# Patient Record
Sex: FEMALE | Race: OTHER | NOT HISPANIC OR LATINO | URBAN - METROPOLITAN AREA
[De-identification: names, ages, dates, MRNs, and addresses within clinical notes are randomized per-mention and may not be internally consistent; named-entity substitution may affect disease eponyms.]

---

## 2017-03-08 ENCOUNTER — OUTPATIENT (OUTPATIENT)
Dept: OUTPATIENT SERVICES | Facility: HOSPITAL | Age: 47
LOS: 1 days | End: 2017-03-08
Payer: COMMERCIAL

## 2017-03-08 VITALS
HEIGHT: 64 IN | WEIGHT: 194.01 LBS | HEART RATE: 84 BPM | TEMPERATURE: 98 F | RESPIRATION RATE: 14 BRPM | SYSTOLIC BLOOD PRESSURE: 103 MMHG | DIASTOLIC BLOOD PRESSURE: 58 MMHG

## 2017-03-08 DIAGNOSIS — Z41.9 ENCOUNTER FOR PROCEDURE FOR PURPOSES OTHER THAN REMEDYING HEALTH STATE, UNSPECIFIED: Chronic | ICD-10-CM

## 2017-03-08 DIAGNOSIS — Z01.812 ENCOUNTER FOR PREPROCEDURAL LABORATORY EXAMINATION: ICD-10-CM

## 2017-03-08 DIAGNOSIS — N84.1 POLYP OF CERVIX UTERI: ICD-10-CM

## 2017-03-08 LAB
HCG UR QL: NEGATIVE — SIGNIFICANT CHANGE UP
HCT VFR BLD CALC: 39 % — SIGNIFICANT CHANGE UP (ref 34.5–45)
HGB BLD-MCNC: 13 G/DL — SIGNIFICANT CHANGE UP (ref 11.5–15.5)
MCHC RBC-ENTMCNC: 32.1 PG — SIGNIFICANT CHANGE UP (ref 27–34)
MCHC RBC-ENTMCNC: 33.3 GM/DL — SIGNIFICANT CHANGE UP (ref 32–36)
MCV RBC AUTO: 96.2 FL — SIGNIFICANT CHANGE UP (ref 80–100)
PLATELET # BLD AUTO: 191 K/UL — SIGNIFICANT CHANGE UP (ref 150–400)
RBC # BLD: 4.05 M/UL — SIGNIFICANT CHANGE UP (ref 3.8–5.2)
RBC # FLD: 12.2 % — SIGNIFICANT CHANGE UP (ref 10.3–14.5)
WBC # BLD: 8.8 K/UL — SIGNIFICANT CHANGE UP (ref 3.8–10.5)
WBC # FLD AUTO: 8.8 K/UL — SIGNIFICANT CHANGE UP (ref 3.8–10.5)

## 2017-03-08 PROCEDURE — 86850 RBC ANTIBODY SCREEN: CPT

## 2017-03-08 PROCEDURE — 85027 COMPLETE CBC AUTOMATED: CPT

## 2017-03-08 PROCEDURE — 86900 BLOOD TYPING SEROLOGIC ABO: CPT

## 2017-03-08 PROCEDURE — 86901 BLOOD TYPING SEROLOGIC RH(D): CPT

## 2017-03-08 PROCEDURE — G0463: CPT

## 2017-03-08 PROCEDURE — 81025 URINE PREGNANCY TEST: CPT

## 2017-03-08 NOTE — H&P PST ADULT - NSANTHOSAYNRD_GEN_A_CORE
No. JENNIFER screening performed.  STOP BANG Legend: 0-2 = LOW Risk; 3-4 = INTERMEDIATE Risk; 5-8 = HIGH Risk

## 2017-03-08 NOTE — H&P PST ADULT - HISTORY OF PRESENT ILLNESS
46 yo F for dilation and curettage  hysteroscopy  w Myosure and U/S  LMP 17     Pt reports menses is normal

## 2017-03-10 RX ORDER — ACETAMINOPHEN 500 MG
650 TABLET ORAL ONCE
Qty: 0 | Refills: 0 | Status: COMPLETED | OUTPATIENT
Start: 2017-03-21 | End: 2017-03-21

## 2017-03-10 RX ORDER — SODIUM CHLORIDE 9 MG/ML
1000 INJECTION, SOLUTION INTRAVENOUS
Qty: 0 | Refills: 0 | Status: DISCONTINUED | OUTPATIENT
Start: 2017-03-21 | End: 2017-03-21

## 2017-03-20 ENCOUNTER — RESULT REVIEW (OUTPATIENT)
Age: 47
End: 2017-03-20

## 2017-03-21 ENCOUNTER — TRANSCRIPTION ENCOUNTER (OUTPATIENT)
Age: 47
End: 2017-03-21

## 2017-03-21 ENCOUNTER — OUTPATIENT (OUTPATIENT)
Dept: OUTPATIENT SERVICES | Facility: HOSPITAL | Age: 47
LOS: 1 days | Discharge: HOME | End: 2017-03-21
Payer: COMMERCIAL

## 2017-03-21 VITALS
OXYGEN SATURATION: 98 % | RESPIRATION RATE: 14 BRPM | SYSTOLIC BLOOD PRESSURE: 107 MMHG | HEIGHT: 64 IN | DIASTOLIC BLOOD PRESSURE: 70 MMHG | TEMPERATURE: 99 F | WEIGHT: 194.01 LBS | HEART RATE: 73 BPM

## 2017-03-21 VITALS
SYSTOLIC BLOOD PRESSURE: 102 MMHG | OXYGEN SATURATION: 100 % | RESPIRATION RATE: 17 BRPM | DIASTOLIC BLOOD PRESSURE: 64 MMHG | HEART RATE: 56 BPM

## 2017-03-21 DIAGNOSIS — N84.1 POLYP OF CERVIX UTERI: ICD-10-CM

## 2017-03-21 DIAGNOSIS — Z41.9 ENCOUNTER FOR PROCEDURE FOR PURPOSES OTHER THAN REMEDYING HEALTH STATE, UNSPECIFIED: Chronic | ICD-10-CM

## 2017-03-21 PROCEDURE — 81025 URINE PREGNANCY TEST: CPT

## 2017-03-21 PROCEDURE — 88305 TISSUE EXAM BY PATHOLOGIST: CPT

## 2017-03-21 PROCEDURE — 58558 HYSTEROSCOPY BIOPSY: CPT

## 2017-03-21 PROCEDURE — 76998 US GUIDE INTRAOP: CPT

## 2017-03-21 PROCEDURE — 88305 TISSUE EXAM BY PATHOLOGIST: CPT | Mod: 26

## 2017-03-21 PROCEDURE — 76998 US GUIDE INTRAOP: CPT | Mod: 26

## 2017-03-21 RX ORDER — SODIUM CHLORIDE 9 MG/ML
1000 INJECTION, SOLUTION INTRAVENOUS
Qty: 0 | Refills: 0 | Status: DISCONTINUED | OUTPATIENT
Start: 2017-03-21 | End: 2017-03-21

## 2017-03-21 RX ORDER — HYDROMORPHONE HYDROCHLORIDE 2 MG/ML
0.5 INJECTION INTRAMUSCULAR; INTRAVENOUS; SUBCUTANEOUS
Qty: 0 | Refills: 0 | Status: DISCONTINUED | OUTPATIENT
Start: 2017-03-21 | End: 2017-03-21

## 2017-03-21 RX ORDER — MEPERIDINE HYDROCHLORIDE 50 MG/ML
12.5 INJECTION INTRAMUSCULAR; INTRAVENOUS; SUBCUTANEOUS
Qty: 0 | Refills: 0 | Status: DISCONTINUED | OUTPATIENT
Start: 2017-03-21 | End: 2017-03-21

## 2017-03-21 RX ADMIN — SODIUM CHLORIDE 100 MILLILITER(S): 9 INJECTION, SOLUTION INTRAVENOUS at 14:18

## 2017-03-21 RX ADMIN — Medication 650 MILLIGRAM(S): at 12:43

## 2017-03-21 RX ADMIN — SODIUM CHLORIDE 75 MILLILITER(S): 9 INJECTION, SOLUTION INTRAVENOUS at 12:43

## 2017-03-21 NOTE — ASU DISCHARGE PLAN (ADULT/PEDIATRIC). - ACTIVITY LEVEL
no sports/gym/no tampons/no heavy lifting/no exercise/no tub baths/no intercourse/no douching/nothing per vagina

## 2017-03-21 NOTE — ASU DISCHARGE PLAN (ADULT/PEDIATRIC). - NOTIFY
GYN Fever>100.4/Excessive Diarrhea/Inability to Tolerate Liquids or Foods/Swelling that continues/Bleeding that does not stop/Numbness, tingling/Unable to Urinate/Persistent Nausea and Vomiting/Numbness, color, or temperature change to extremity/Pain not relieved by Medications

## 2017-03-23 DIAGNOSIS — N93.8 OTHER SPECIFIED ABNORMAL UTERINE AND VAGINAL BLEEDING: ICD-10-CM

## 2017-03-23 DIAGNOSIS — Z88.0 ALLERGY STATUS TO PENICILLIN: ICD-10-CM

## 2017-03-23 DIAGNOSIS — E66.9 OBESITY, UNSPECIFIED: ICD-10-CM

## 2017-03-23 DIAGNOSIS — D25.0 SUBMUCOUS LEIOMYOMA OF UTERUS: ICD-10-CM

## 2017-03-24 LAB — SURGICAL PATHOLOGY FINAL REPORT - CH: SIGNIFICANT CHANGE UP

## 2017-04-03 DIAGNOSIS — N84.0 POLYP OF CORPUS UTERI: ICD-10-CM

## 2017-08-22 ENCOUNTER — OUTPATIENT (OUTPATIENT)
Dept: OUTPATIENT SERVICES | Facility: HOSPITAL | Age: 47
LOS: 1 days | End: 2017-08-22
Payer: COMMERCIAL

## 2017-08-22 VITALS
RESPIRATION RATE: 16 BRPM | WEIGHT: 195.99 LBS | TEMPERATURE: 98 F | DIASTOLIC BLOOD PRESSURE: 73 MMHG | HEART RATE: 61 BPM | SYSTOLIC BLOOD PRESSURE: 109 MMHG

## 2017-08-22 DIAGNOSIS — Z98.890 OTHER SPECIFIED POSTPROCEDURAL STATES: Chronic | ICD-10-CM

## 2017-08-22 DIAGNOSIS — Z01.818 ENCOUNTER FOR OTHER PREPROCEDURAL EXAMINATION: ICD-10-CM

## 2017-08-22 DIAGNOSIS — N84.0 POLYP OF CORPUS UTERI: ICD-10-CM

## 2017-08-22 DIAGNOSIS — Z41.9 ENCOUNTER FOR PROCEDURE FOR PURPOSES OTHER THAN REMEDYING HEALTH STATE, UNSPECIFIED: Chronic | ICD-10-CM

## 2017-08-22 LAB
HCG UR QL: NEGATIVE — SIGNIFICANT CHANGE UP
HCT VFR BLD CALC: 38.2 % — SIGNIFICANT CHANGE UP (ref 34.5–45)
HGB BLD-MCNC: 13.3 G/DL — SIGNIFICANT CHANGE UP (ref 11.5–15.5)
MCHC RBC-ENTMCNC: 32.9 PG — SIGNIFICANT CHANGE UP (ref 27–34)
MCHC RBC-ENTMCNC: 34.7 GM/DL — SIGNIFICANT CHANGE UP (ref 32–36)
MCV RBC AUTO: 94.8 FL — SIGNIFICANT CHANGE UP (ref 80–100)
PLATELET # BLD AUTO: 185 K/UL — SIGNIFICANT CHANGE UP (ref 150–400)
RBC # BLD: 4.03 M/UL — SIGNIFICANT CHANGE UP (ref 3.8–5.2)
RBC # FLD: 11.3 % — SIGNIFICANT CHANGE UP (ref 10.3–14.5)
WBC # BLD: 8.3 K/UL — SIGNIFICANT CHANGE UP (ref 3.8–10.5)
WBC # FLD AUTO: 8.3 K/UL — SIGNIFICANT CHANGE UP (ref 3.8–10.5)

## 2017-08-22 PROCEDURE — 81025 URINE PREGNANCY TEST: CPT

## 2017-08-22 PROCEDURE — 86850 RBC ANTIBODY SCREEN: CPT

## 2017-08-22 PROCEDURE — 85027 COMPLETE CBC AUTOMATED: CPT

## 2017-08-22 PROCEDURE — 36415 COLL VENOUS BLD VENIPUNCTURE: CPT

## 2017-08-22 PROCEDURE — G0463: CPT

## 2017-08-22 PROCEDURE — 86900 BLOOD TYPING SEROLOGIC ABO: CPT

## 2017-08-22 PROCEDURE — 86901 BLOOD TYPING SEROLOGIC RH(D): CPT

## 2017-08-22 NOTE — H&P PST ADULT - NEGATIVE GASTROINTESTINAL SYMPTOMS
no diarrhea/no constipation/no nausea/no change in bowel habits/no hiccoughs/no melena/no abdominal pain/no flatulence/no hematochezia/no steatorrhea/no jaundice/no vomiting

## 2017-08-22 NOTE — H&P PST ADULT - NEGATIVE CARDIOVASCULAR SYMPTOMS
no orthopnea/no palpitations/no dyspnea on exertion/no chest pain/no claudication/no paroxysmal nocturnal dyspnea/no peripheral edema

## 2017-08-22 NOTE — H&P PST ADULT - PROBLEM SELECTOR PLAN 1
Dilation and curettage hysteroscopy with myosure ultrasound on 8/29/17.   Medical clearance needed as per Dr. Marlow.  CBC, T&S and UCG ordered.  Pre-op instructions given and pt verbalized understanding.

## 2017-08-22 NOTE — H&P PST ADULT - HISTORY OF PRESENT ILLNESS
48 yo F for dilation and curettage  hysteroscopy  w Myosure and U/S  LMP 17     Pt reports menses is normal 48yo female with no PMH here for PST. Pt s/p sonogram done in office last week and "there is thickening of my uterine lining". Pt denies fibroids, cysts and polyps. Pt denies menorrhagia and dysmenorrhea. Pt denies abdominal and pelvic pain. Pt electing for dilation and curettage hysteroscopy with myosure ultrasound on 8/29/17.

## 2017-08-22 NOTE — H&P PST ADULT - RS GEN PE MLT RESP DETAILS PC
airway patent/good air movement/normal/breath sounds equal/respirations non-labored/clear to auscultation bilaterally

## 2017-08-22 NOTE — H&P PST ADULT - LYMPHATIC
anterior cervical R/anterior cervical L supraclavicular R/posterior cervical R/anterior cervical L/supraclavicular L/anterior cervical R/posterior cervical L

## 2017-08-22 NOTE — H&P PST ADULT - ASSESSMENT
46 yo F for dilation and curettage  hysteroscopy  w Myosure and U/S 46yo female with polyp of corpus uteri

## 2017-08-22 NOTE — H&P PST ADULT - GASTROINTESTINAL DETAILS
no distention/bowel sounds normal/soft/normal/no masses palpable/nontender/no guarding/no rigidity/no bruit/no rebound tenderness/no organomegaly

## 2017-08-22 NOTE — H&P PST ADULT - FAMILY HISTORY
Father  Still living? No  Family history of kidney cancer, Age at diagnosis: Age Unknown     Sibling  Still living? Yes, Estimated age: Age Unknown  Family history of breast cancer, Age at diagnosis: Age Unknown

## 2017-08-28 RX ORDER — SODIUM CHLORIDE 9 MG/ML
3 INJECTION INTRAMUSCULAR; INTRAVENOUS; SUBCUTANEOUS ONCE
Qty: 0 | Refills: 0 | Status: DISCONTINUED | OUTPATIENT
Start: 2017-08-29 | End: 2017-08-29

## 2017-08-28 RX ORDER — SODIUM CHLORIDE 9 MG/ML
1000 INJECTION, SOLUTION INTRAVENOUS
Qty: 0 | Refills: 0 | Status: DISCONTINUED | OUTPATIENT
Start: 2017-08-29 | End: 2017-08-29

## 2017-08-29 ENCOUNTER — RESULT REVIEW (OUTPATIENT)
Age: 47
End: 2017-08-29

## 2017-08-29 ENCOUNTER — TRANSCRIPTION ENCOUNTER (OUTPATIENT)
Age: 47
End: 2017-08-29

## 2017-08-29 ENCOUNTER — OUTPATIENT (OUTPATIENT)
Dept: OUTPATIENT SERVICES | Facility: HOSPITAL | Age: 47
LOS: 1 days | Discharge: HOME | End: 2017-08-29
Payer: COMMERCIAL

## 2017-08-29 VITALS
DIASTOLIC BLOOD PRESSURE: 69 MMHG | TEMPERATURE: 99 F | HEART RATE: 64 BPM | HEIGHT: 64 IN | OXYGEN SATURATION: 99 % | RESPIRATION RATE: 12 BRPM | SYSTOLIC BLOOD PRESSURE: 104 MMHG | WEIGHT: 195.99 LBS

## 2017-08-29 VITALS
OXYGEN SATURATION: 98 % | HEART RATE: 58 BPM | SYSTOLIC BLOOD PRESSURE: 110 MMHG | RESPIRATION RATE: 14 BRPM | DIASTOLIC BLOOD PRESSURE: 76 MMHG

## 2017-08-29 DIAGNOSIS — Z98.890 OTHER SPECIFIED POSTPROCEDURAL STATES: Chronic | ICD-10-CM

## 2017-08-29 DIAGNOSIS — Z01.818 ENCOUNTER FOR OTHER PREPROCEDURAL EXAMINATION: ICD-10-CM

## 2017-08-29 DIAGNOSIS — N84.0 POLYP OF CORPUS UTERI: ICD-10-CM

## 2017-08-29 DIAGNOSIS — Z41.9 ENCOUNTER FOR PROCEDURE FOR PURPOSES OTHER THAN REMEDYING HEALTH STATE, UNSPECIFIED: Chronic | ICD-10-CM

## 2017-08-29 PROCEDURE — 88305 TISSUE EXAM BY PATHOLOGIST: CPT

## 2017-08-29 PROCEDURE — 58558 HYSTEROSCOPY BIOPSY: CPT

## 2017-08-29 PROCEDURE — 76998 US GUIDE INTRAOP: CPT

## 2017-08-29 PROCEDURE — 88305 TISSUE EXAM BY PATHOLOGIST: CPT | Mod: 26

## 2017-08-29 RX ORDER — OXYCODONE HYDROCHLORIDE 5 MG/1
10 TABLET ORAL EVERY 6 HOURS
Qty: 0 | Refills: 0 | Status: DISCONTINUED | OUTPATIENT
Start: 2017-08-29 | End: 2017-08-29

## 2017-08-29 RX ORDER — SODIUM CHLORIDE 9 MG/ML
1000 INJECTION, SOLUTION INTRAVENOUS
Qty: 0 | Refills: 0 | Status: DISCONTINUED | OUTPATIENT
Start: 2017-08-29 | End: 2017-08-29

## 2017-08-29 RX ORDER — HYDROMORPHONE HYDROCHLORIDE 2 MG/ML
0.5 INJECTION INTRAMUSCULAR; INTRAVENOUS; SUBCUTANEOUS
Qty: 0 | Refills: 0 | Status: DISCONTINUED | OUTPATIENT
Start: 2017-08-29 | End: 2017-08-29

## 2017-08-29 RX ORDER — OXYCODONE HYDROCHLORIDE 5 MG/1
5 TABLET ORAL EVERY 4 HOURS
Qty: 0 | Refills: 0 | Status: DISCONTINUED | OUTPATIENT
Start: 2017-08-29 | End: 2017-08-29

## 2017-08-29 RX ADMIN — SODIUM CHLORIDE 100 MILLILITER(S): 9 INJECTION, SOLUTION INTRAVENOUS at 11:10

## 2017-08-29 RX ADMIN — SODIUM CHLORIDE 30 MILLILITER(S): 9 INJECTION, SOLUTION INTRAVENOUS at 10:00

## 2017-08-29 NOTE — ASU DISCHARGE PLAN (ADULT/PEDIATRIC). - NOTIFY
GYN Fever>100.4/Numbness, tingling/Persistent Nausea and Vomiting/Numbness, color, or temperature change to extremity/Pain not relieved by Medications/Swelling that continues/Bleeding that does not stop

## 2017-08-29 NOTE — ASU DISCHARGE PLAN (ADULT/PEDIATRIC). - ACTIVITY LEVEL
no sports/gym/nothing per vagina/no tampons/no exercise/no tub baths/no douching/no intercourse/no heavy lifting

## 2017-08-29 NOTE — ASU DISCHARGE PLAN (ADULT/PEDIATRIC). - SPECIAL INSTRUCTIONS
****Call the office with any problems including but not limited to heavy vaginal bleeding, fevers, severe abdominal pain, inability to eat/drink/urinate  **** Nothing in the vagina x2 weeks-( No sex, tampons, douching )  *****You may shower as usual but no hot tubs, bath tubs, swimming pools x2 weeks..

## 2017-08-30 LAB — SURGICAL PATHOLOGY FINAL REPORT - CH: SIGNIFICANT CHANGE UP

## 2017-09-01 DIAGNOSIS — Z88.0 ALLERGY STATUS TO PENICILLIN: ICD-10-CM

## 2017-09-01 DIAGNOSIS — E66.9 OBESITY, UNSPECIFIED: ICD-10-CM

## 2017-09-01 DIAGNOSIS — N84.1 POLYP OF CERVIX UTERI: ICD-10-CM

## 2017-09-01 DIAGNOSIS — N84.0 POLYP OF CORPUS UTERI: ICD-10-CM

## 2018-04-30 NOTE — ASU PREOP CHECKLIST - WEIGHT IN LBS
Patient is requesting refill of amphetamine-dextroamphetamine (ADDERALL) 10 MG Oral Tab and amphetamine-dextroamphetamine (ADDERALL) 20 MG Oral Tab. Thank you. 194

## 2018-07-16 PROBLEM — N85.8 OTHER SPECIFIED NONINFLAMMATORY DISORDERS OF UTERUS: Chronic | Status: INACTIVE | Noted: 2017-03-08 | Resolved: 2017-08-22

## 2019-05-09 NOTE — ASU PATIENT PROFILE, ADULT - AS SC BRADEN MOISTURE
May 10, 2019      [address]     [place] [address]        Dear ,    We are writing to inform you of your test results.    Your test results fall within the expected range(s) or remain unchanged from previous results.  Please continue with current treatment plan.    Resulted Orders   GC/Chlamydia by PCR - [facility]   Result Value Ref Range    Specimen Source Urine     Neisseria gonorrhoreae PCR Not Detected NDET^Not Detected      Comment:      NOT DETECTED: Negative for N.gonorrhoeae genomic DNA by [employer]   real-time,reverse-transcriptase PCR. A negative result does not preclude the   presence of N.gonorrhoeae infection. The results are dependent on proper   collection,transport,processing of the specimen,and the presence of sufficient   DNA to be detected.      Chlamydia Trachomatis PCR Not Detected NDET^Not Detected      Comment:      NOTDETECTED: Negative for C.trachomatis genomic DNA by Cepeid   real-time,reverse-transcriptase PCR. A negative result does not preclude the   presence of C.trachomatis infection. The results are dependent on proper   collection,transpoet,processing of specimen, and the presence of sufficient   DNA to be detected.         If you have any questions or concerns, please call the clinic at the number listed above.       Sincerely,        Jacinto Currie MD                 (4) rarely moist

## 2021-06-02 PROBLEM — N84.0 POLYP OF CORPUS UTERI: Chronic | Status: ACTIVE | Noted: 2017-08-22

## 2021-06-21 ENCOUNTER — APPOINTMENT (OUTPATIENT)
Dept: MRI IMAGING | Facility: CLINIC | Age: 51
End: 2021-06-21

## 2021-06-21 ENCOUNTER — APPOINTMENT (OUTPATIENT)
Dept: RADIOLOGY | Facility: CLINIC | Age: 51
End: 2021-06-21

## 2021-09-01 ENCOUNTER — OUTPATIENT (OUTPATIENT)
Dept: OUTPATIENT SERVICES | Facility: HOSPITAL | Age: 51
LOS: 1 days | End: 2021-09-01
Payer: COMMERCIAL

## 2021-09-01 DIAGNOSIS — Z98.890 OTHER SPECIFIED POSTPROCEDURAL STATES: Chronic | ICD-10-CM

## 2021-09-01 DIAGNOSIS — Z41.9 ENCOUNTER FOR PROCEDURE FOR PURPOSES OTHER THAN REMEDYING HEALTH STATE, UNSPECIFIED: Chronic | ICD-10-CM

## 2021-09-01 DIAGNOSIS — C50.412 MALIGNANT NEOPLASM OF UPPER-OUTER QUADRANT OF LEFT FEMALE BREAST: ICD-10-CM

## 2021-09-01 DIAGNOSIS — N62 HYPERTROPHY OF BREAST: ICD-10-CM

## 2021-09-02 ENCOUNTER — RESULT REVIEW (OUTPATIENT)
Age: 51
End: 2021-09-02

## 2021-09-02 PROCEDURE — 88321 CONSLTJ&REPRT SLD PREP ELSWR: CPT

## 2021-09-20 ENCOUNTER — OUTPATIENT (OUTPATIENT)
Dept: OUTPATIENT SERVICES | Facility: HOSPITAL | Age: 51
LOS: 1 days | End: 2021-09-20
Payer: COMMERCIAL

## 2021-09-20 VITALS
HEART RATE: 60 BPM | SYSTOLIC BLOOD PRESSURE: 124 MMHG | TEMPERATURE: 97 F | HEIGHT: 64 IN | DIASTOLIC BLOOD PRESSURE: 85 MMHG | OXYGEN SATURATION: 100 % | RESPIRATION RATE: 16 BRPM | WEIGHT: 215.61 LBS

## 2021-09-20 DIAGNOSIS — C50.412 MALIGNANT NEOPLASM OF UPPER-OUTER QUADRANT OF LEFT FEMALE BREAST: ICD-10-CM

## 2021-09-20 DIAGNOSIS — Z41.9 ENCOUNTER FOR PROCEDURE FOR PURPOSES OTHER THAN REMEDYING HEALTH STATE, UNSPECIFIED: Chronic | ICD-10-CM

## 2021-09-20 DIAGNOSIS — Z98.890 OTHER SPECIFIED POSTPROCEDURAL STATES: Chronic | ICD-10-CM

## 2021-09-20 DIAGNOSIS — Z01.818 ENCOUNTER FOR OTHER PREPROCEDURAL EXAMINATION: ICD-10-CM

## 2021-09-20 DIAGNOSIS — N62 HYPERTROPHY OF BREAST: ICD-10-CM

## 2021-09-20 LAB
HCT VFR BLD CALC: 37.4 % — SIGNIFICANT CHANGE UP (ref 34.5–45)
HGB BLD-MCNC: 12.6 G/DL — SIGNIFICANT CHANGE UP (ref 11.5–15.5)
MCHC RBC-ENTMCNC: 31 PG — SIGNIFICANT CHANGE UP (ref 27–34)
MCHC RBC-ENTMCNC: 33.7 GM/DL — SIGNIFICANT CHANGE UP (ref 32–36)
MCV RBC AUTO: 91.9 FL — SIGNIFICANT CHANGE UP (ref 80–100)
NRBC # BLD: 0 /100 WBCS — SIGNIFICANT CHANGE UP (ref 0–0)
PLATELET # BLD AUTO: 229 K/UL — SIGNIFICANT CHANGE UP (ref 150–400)
RBC # BLD: 4.07 M/UL — SIGNIFICANT CHANGE UP (ref 3.8–5.2)
RBC # FLD: 11.9 % — SIGNIFICANT CHANGE UP (ref 10.3–14.5)
WBC # BLD: 6.86 K/UL — SIGNIFICANT CHANGE UP (ref 3.8–10.5)
WBC # FLD AUTO: 6.86 K/UL — SIGNIFICANT CHANGE UP (ref 3.8–10.5)

## 2021-09-20 PROCEDURE — 85027 COMPLETE CBC AUTOMATED: CPT

## 2021-09-20 PROCEDURE — G0463: CPT

## 2021-09-20 PROCEDURE — 36415 COLL VENOUS BLD VENIPUNCTURE: CPT

## 2021-09-20 NOTE — H&P PST ADULT - NSICDXPASTMEDICALHX_GEN_ALL_CORE_FT
PAST MEDICAL HISTORY:  Malignant neoplasm of upper-outer quadrant of left female breast     Polyp of corpus uteri

## 2021-09-20 NOTE — H&P PST ADULT - RESPIRATORY AND THORAX
Chief Complaint   Patient presents with     Blood Draw     labs drawn     There were no vitals taken for this visit.    Vitals taken.  Blood collected from right antecub venipuncture. Pt tolerated well.     Estee Hampton RN    
details…

## 2021-09-20 NOTE — H&P PST ADULT - PROBLEM SELECTOR PLAN 1
Patient provided with pre-operative instructions and verbalized understanding.  Patient will be NPO on day of surgery. Patient will stop NSAIDs, aspirin, herbal supplements or vitamins 1 week prior to surgery.  Chlorohexadine wash provided with instructions.  COVID PCR pending per policy.

## 2021-09-20 NOTE — H&P PST ADULT - ATTENDING COMMENTS
The patient is a 51 year old female who has been diagnosed with left breast invasive lobular carcinoma and left breast atypical lobular hyperplasia. She presents to undergo a left anitha  localization lumpectomy, left anitha  localization breast biopsy, left axillary sentinel lymph node biopsy, possible left axillary lymph node dissection and bilateral oncoplastic lift.

## 2021-09-20 NOTE — H&P PST ADULT - HISTORY OF PRESENT ILLNESS
This si a 51 year old female who presents to office with pre-operative diagnosis of malignant neoplasm of upper-outer quadrant of left female breast.  Lesion noted in left breast and 2 lesions in right breast on routine mammogram in 5/2021. s/p biopsies x 3.  Left breast biopsy returned with malignant cells.  Right breast biopsies were benign.   denies feeling breast lumps, breast tenderness, and nipple discharge.  Otherwise patient denies any other medical conditions.  Feels well today and denies any issues.

## 2021-09-20 NOTE — H&P PST ADULT - NSICDXFAMILYHX_GEN_ALL_CORE_FT
FAMILY HISTORY:  Father  Still living? No  Family history of kidney cancer, Age at diagnosis: Age Unknown    Sibling  Still living? Yes, Estimated age: Age Unknown  Family history of breast cancer, Age at diagnosis: Age Unknown

## 2021-09-20 NOTE — H&P PST ADULT - ASSESSMENT
51 year old female with malignant neoplacm of upper-outer quadrant of left female breast and hypertrophy of breast.

## 2021-09-21 PROBLEM — C50.412 MALIGNANT NEOPLASM OF UPPER-OUTER QUADRANT OF LEFT FEMALE BREAST: Chronic | Status: ACTIVE | Noted: 2021-09-20

## 2021-09-27 ENCOUNTER — APPOINTMENT (OUTPATIENT)
Dept: MAMMOGRAPHY | Facility: IMAGING CENTER | Age: 51
End: 2021-09-27
Payer: COMMERCIAL

## 2021-09-27 ENCOUNTER — OUTPATIENT (OUTPATIENT)
Dept: OUTPATIENT SERVICES | Facility: HOSPITAL | Age: 51
LOS: 1 days | End: 2021-09-27
Payer: COMMERCIAL

## 2021-09-27 DIAGNOSIS — Z98.890 OTHER SPECIFIED POSTPROCEDURAL STATES: Chronic | ICD-10-CM

## 2021-09-27 DIAGNOSIS — Z41.9 ENCOUNTER FOR PROCEDURE FOR PURPOSES OTHER THAN REMEDYING HEALTH STATE, UNSPECIFIED: Chronic | ICD-10-CM

## 2021-09-27 DIAGNOSIS — Z00.8 ENCOUNTER FOR OTHER GENERAL EXAMINATION: ICD-10-CM

## 2021-09-27 PROCEDURE — C1739: CPT

## 2021-09-27 PROCEDURE — 19282 PERQ DEVICE BREAST EA IMAG: CPT | Mod: LT

## 2021-09-27 PROCEDURE — 19281 PERQ DEVICE BREAST 1ST IMAG: CPT | Mod: LT

## 2021-09-27 PROCEDURE — 19281 PERQ DEVICE BREAST 1ST IMAG: CPT

## 2021-09-27 PROCEDURE — 19282 PERQ DEVICE BREAST EA IMAG: CPT

## 2021-09-29 DIAGNOSIS — Z01.818 ENCOUNTER FOR OTHER PREPROCEDURAL EXAMINATION: ICD-10-CM

## 2021-10-01 ENCOUNTER — APPOINTMENT (OUTPATIENT)
Dept: DISASTER EMERGENCY | Facility: CLINIC | Age: 51
End: 2021-10-01

## 2021-10-02 LAB — SARS-COV-2 N GENE NPH QL NAA+PROBE: NOT DETECTED

## 2021-10-03 ENCOUNTER — TRANSCRIPTION ENCOUNTER (OUTPATIENT)
Age: 51
End: 2021-10-03

## 2021-10-04 ENCOUNTER — OUTPATIENT (OUTPATIENT)
Dept: OUTPATIENT SERVICES | Facility: HOSPITAL | Age: 51
LOS: 1 days | End: 2021-10-04
Payer: COMMERCIAL

## 2021-10-04 ENCOUNTER — RESULT REVIEW (OUTPATIENT)
Age: 51
End: 2021-10-04

## 2021-10-04 VITALS
HEART RATE: 65 BPM | TEMPERATURE: 99 F | DIASTOLIC BLOOD PRESSURE: 62 MMHG | OXYGEN SATURATION: 98 % | SYSTOLIC BLOOD PRESSURE: 100 MMHG | RESPIRATION RATE: 14 BRPM

## 2021-10-04 VITALS
TEMPERATURE: 98 F | DIASTOLIC BLOOD PRESSURE: 74 MMHG | RESPIRATION RATE: 16 BRPM | HEIGHT: 64 IN | WEIGHT: 215.61 LBS | OXYGEN SATURATION: 98 % | HEART RATE: 70 BPM | SYSTOLIC BLOOD PRESSURE: 109 MMHG

## 2021-10-04 DIAGNOSIS — N62 HYPERTROPHY OF BREAST: ICD-10-CM

## 2021-10-04 DIAGNOSIS — C50.412 MALIGNANT NEOPLASM OF UPPER-OUTER QUADRANT OF LEFT FEMALE BREAST: ICD-10-CM

## 2021-10-04 DIAGNOSIS — Z98.890 OTHER SPECIFIED POSTPROCEDURAL STATES: Chronic | ICD-10-CM

## 2021-10-04 DIAGNOSIS — Z41.9 ENCOUNTER FOR PROCEDURE FOR PURPOSES OTHER THAN REMEDYING HEALTH STATE, UNSPECIFIED: Chronic | ICD-10-CM

## 2021-10-04 PROCEDURE — 88305 TISSUE EXAM BY PATHOLOGIST: CPT

## 2021-10-04 PROCEDURE — 88342 IMHCHEM/IMCYTCHM 1ST ANTB: CPT

## 2021-10-04 PROCEDURE — 88304 TISSUE EXAM BY PATHOLOGIST: CPT

## 2021-10-04 PROCEDURE — 88342 IMHCHEM/IMCYTCHM 1ST ANTB: CPT | Mod: 26

## 2021-10-04 PROCEDURE — 88304 TISSUE EXAM BY PATHOLOGIST: CPT | Mod: 26

## 2021-10-04 PROCEDURE — 19301 PARTIAL MASTECTOMY: CPT | Mod: LT

## 2021-10-04 PROCEDURE — A9541: CPT

## 2021-10-04 PROCEDURE — 76098 X-RAY EXAM SURGICAL SPECIMEN: CPT

## 2021-10-04 PROCEDURE — 38900 IO MAP OF SENT LYMPH NODE: CPT | Mod: LT

## 2021-10-04 PROCEDURE — 88341 IMHCHEM/IMCYTCHM EA ADD ANTB: CPT

## 2021-10-04 PROCEDURE — 76098 X-RAY EXAM SURGICAL SPECIMEN: CPT | Mod: 26

## 2021-10-04 PROCEDURE — 88305 TISSUE EXAM BY PATHOLOGIST: CPT | Mod: 26

## 2021-10-04 PROCEDURE — 88307 TISSUE EXAM BY PATHOLOGIST: CPT | Mod: 26

## 2021-10-04 PROCEDURE — 88341 IMHCHEM/IMCYTCHM EA ADD ANTB: CPT | Mod: 26

## 2021-10-04 PROCEDURE — 88307 TISSUE EXAM BY PATHOLOGIST: CPT

## 2021-10-04 PROCEDURE — 38525 BIOPSY/REMOVAL LYMPH NODES: CPT | Mod: LT

## 2021-10-04 PROCEDURE — 19318 BREAST REDUCTION: CPT | Mod: 50

## 2021-10-04 RX ORDER — HYDROMORPHONE HYDROCHLORIDE 2 MG/ML
0.5 INJECTION INTRAMUSCULAR; INTRAVENOUS; SUBCUTANEOUS
Refills: 0 | Status: DISCONTINUED | OUTPATIENT
Start: 2021-10-04 | End: 2021-10-04

## 2021-10-04 RX ORDER — SODIUM CHLORIDE 9 MG/ML
1000 INJECTION, SOLUTION INTRAVENOUS
Refills: 0 | Status: DISCONTINUED | OUTPATIENT
Start: 2021-10-04 | End: 2021-10-04

## 2021-10-04 RX ORDER — HYDROMORPHONE HYDROCHLORIDE 2 MG/ML
1 INJECTION INTRAMUSCULAR; INTRAVENOUS; SUBCUTANEOUS
Refills: 0 | Status: DISCONTINUED | OUTPATIENT
Start: 2021-10-04 | End: 2021-10-04

## 2021-10-04 RX ORDER — ONDANSETRON 8 MG/1
4 TABLET, FILM COATED ORAL ONCE
Refills: 0 | Status: DISCONTINUED | OUTPATIENT
Start: 2021-10-04 | End: 2021-10-04

## 2021-10-04 RX ADMIN — SODIUM CHLORIDE 50 MILLILITER(S): 9 INJECTION, SOLUTION INTRAVENOUS at 12:54

## 2021-10-04 NOTE — BRIEF OPERATIVE NOTE - SPECIMENS
Left axillary sentinel lymph nodes x 2, left 5:00 breast biopsy, additional inferior margin, left 1:00 lumpectomy
right and left breast tissue

## 2021-10-04 NOTE — ASU DISCHARGE PLAN (ADULT/PEDIATRIC) - PROVIDER TOKENS
PROVIDER:[TOKEN:[[id number]],FOLLOWUP:[1 week]],PROVIDER:[TOKEN:[[id number]],FOLLOWUP:[1 week]]

## 2021-10-04 NOTE — ASU DISCHARGE PLAN (ADULT/PEDIATRIC) - ASU DC SPECIAL INSTRUCTIONSFT
wear support bra do not lie flat wear support bra do not lie flat  Elevate head when sleeping - sleep on back   Keep dressings dry - sponge bathe only   Take the medications as prescribed by Dr Watson s office for pain   Follow up with Dr Watson on Monday Oct 11 call office for appt.

## 2021-10-04 NOTE — BRIEF OPERATIVE NOTE - NSICDXBRIEFPROCEDURE_GEN_ALL_CORE_FT
PROCEDURES:  Lumpectomy of breast with sentinel node biopsy 04-Oct-2021 17:43:56 left 1:00 with anitha  localization Palleschi, Susan M  Excision, lesion identified by x-ray marker, breast 04-Oct-2021 17:44:25 left 5:00 anitha  localization Palleschi, Susan M  Oncoplastic breast reduction 04-Oct-2021 18:44:16 left breast / Symmetrizing reduction Right Breast Madelyn Christiansen  
PROCEDURES:  Lumpectomy of breast with sentinel node biopsy 04-Oct-2021 17:43:56 left 1:00 with anitha  localization Palleschi, Susan M  Excision, lesion identified by x-ray marker, breast 04-Oct-2021 17:44:25 left 5:00 anitha  localization Palleschi, Susan M

## 2021-10-04 NOTE — ASU PATIENT PROFILE, ADULT - NSICDXPASTSURGICALHX_GEN_ALL_CORE_FT
PAST SURGICAL HISTORY:  Elective surgery C section  6/2010    S/P dilatation and curettage (2015 & 3/2017)

## 2021-10-04 NOTE — ASU DISCHARGE PLAN (ADULT/PEDIATRIC) - CARE PROVIDER_API CALL
Palleschi, Susan M (MD)  Surgery  [address] [facility]  [place] [address]  Phone: [phone]  Fax: [phone]  Follow Up Time: 1 week    Nikunj Watson)  Plastic Surgery  [address] [facility], [address]  [place] [address]  Phone: [phone]  Fax: [phone]  Follow Up Time: 1 week

## 2021-10-04 NOTE — BRIEF OPERATIVE NOTE - NSICDXBRIEFPREOP_GEN_ALL_CORE_FT
PRE-OP DIAGNOSIS:  Carcinoma of upper-outer quadrant of left female breast 04-Oct-2021 17:44:58  Palleschi, Susan M  Atypical lobular hyperplasia (ALH) of left breast 04-Oct-2021 17:45:16  Palleschi, Susan M  Cancer of left breast 04-Oct-2021 18:43:35  Madelyn Christiansen  
PRE-OP DIAGNOSIS:  Carcinoma of upper-outer quadrant of left female breast 04-Oct-2021 17:44:58  Palleschi, Susan M  Atypical lobular hyperplasia (ALH) of left breast 04-Oct-2021 17:45:16  Palleschi, Susan M

## 2021-10-04 NOTE — BRIEF OPERATIVE NOTE - NSICDXBRIEFPOSTOP_GEN_ALL_CORE_FT
POST-OP DIAGNOSIS:  Carcinoma of upper-outer quadrant of left female breast 04-Oct-2021 17:45:34  Palleschi, Susan M  Atypical lobular hyperplasia (ALH) of left breast 04-Oct-2021 17:45:50  Palleschi, Susan M  Breast cancer, left 04-Oct-2021 18:43:55  Madelyn Christiansen  
POST-OP DIAGNOSIS:  Carcinoma of upper-outer quadrant of left female breast 04-Oct-2021 17:45:34  Palleschi, Susan M  Atypical lobular hyperplasia (ALH) of left breast 04-Oct-2021 17:45:50  Palleschi, Susan M

## 2021-10-08 LAB — SURGICAL PATHOLOGY STUDY: SIGNIFICANT CHANGE UP

## 2021-10-21 ENCOUNTER — OUTPATIENT (OUTPATIENT)
Dept: OUTPATIENT SERVICES | Facility: HOSPITAL | Age: 51
LOS: 1 days | End: 2021-10-21
Payer: COMMERCIAL

## 2021-10-21 VITALS
HEART RATE: 70 BPM | SYSTOLIC BLOOD PRESSURE: 114 MMHG | HEIGHT: 63 IN | TEMPERATURE: 98 F | WEIGHT: 213.85 LBS | DIASTOLIC BLOOD PRESSURE: 77 MMHG | RESPIRATION RATE: 16 BRPM | OXYGEN SATURATION: 100 %

## 2021-10-21 DIAGNOSIS — Z98.890 OTHER SPECIFIED POSTPROCEDURAL STATES: Chronic | ICD-10-CM

## 2021-10-21 DIAGNOSIS — S21.009A UNSPECIFIED OPEN WOUND OF UNSPECIFIED BREAST, INITIAL ENCOUNTER: ICD-10-CM

## 2021-10-21 DIAGNOSIS — Z85.3 PERSONAL HISTORY OF MALIGNANT NEOPLASM OF BREAST: ICD-10-CM

## 2021-10-21 DIAGNOSIS — C50.412 MALIGNANT NEOPLASM OF UPPER-OUTER QUADRANT OF LEFT FEMALE BREAST: ICD-10-CM

## 2021-10-21 DIAGNOSIS — Z41.9 ENCOUNTER FOR PROCEDURE FOR PURPOSES OTHER THAN REMEDYING HEALTH STATE, UNSPECIFIED: Chronic | ICD-10-CM

## 2021-10-21 DIAGNOSIS — Z01.818 ENCOUNTER FOR OTHER PREPROCEDURAL EXAMINATION: ICD-10-CM

## 2021-10-21 PROCEDURE — G0463: CPT

## 2021-10-21 NOTE — H&P PST ADULT - ATTENDING COMMENTS
The patient is a 51 year old female who recently underwent a left lumpectomy with left axillary sentinel lymph node biopsy with oncoplastic lift. The superior margin of the lumpectomy specimen was positive. She presents to undergo a left breast re-excision lumpectomy.

## 2021-10-21 NOTE — H&P PST ADULT - HISTORY OF PRESENT ILLNESS
This is a 51 year old female who presents to office with pre-operative diagnosis of malignant neoplasm of upper-outer quadrant of left female breast. s/p lumpectomy in 10/4/2021.  Pathology returned with atypical cells along margin of previous lumpectomy.   Patient was advised to have a re-excision.   Otherwise patient denies any other medical conditions.  Feels well today and denies any issues.

## 2021-10-21 NOTE — H&P PST ADULT - NSICDXPASTSURGICALHX_GEN_ALL_CORE_FT
PAST SURGICAL HISTORY:  Elective surgery C section  6/2010    H/O lumpectomy 10/4/2021    S/P dilatation and curettage (2015 & 3/2017)

## 2021-10-25 ENCOUNTER — APPOINTMENT (OUTPATIENT)
Dept: DISASTER EMERGENCY | Facility: CLINIC | Age: 51
End: 2021-10-25

## 2021-10-26 LAB — SARS-COV-2 N GENE NPH QL NAA+PROBE: NOT DETECTED

## 2021-10-27 ENCOUNTER — TRANSCRIPTION ENCOUNTER (OUTPATIENT)
Age: 51
End: 2021-10-27

## 2021-10-28 ENCOUNTER — OUTPATIENT (OUTPATIENT)
Dept: OUTPATIENT SERVICES | Facility: HOSPITAL | Age: 51
LOS: 1 days | End: 2021-10-28
Payer: COMMERCIAL

## 2021-10-28 ENCOUNTER — RESULT REVIEW (OUTPATIENT)
Age: 51
End: 2021-10-28

## 2021-10-28 VITALS
OXYGEN SATURATION: 98 % | RESPIRATION RATE: 15 BRPM | SYSTOLIC BLOOD PRESSURE: 100 MMHG | TEMPERATURE: 98 F | DIASTOLIC BLOOD PRESSURE: 62 MMHG | HEART RATE: 59 BPM

## 2021-10-28 VITALS
TEMPERATURE: 98 F | HEIGHT: 63 IN | RESPIRATION RATE: 14 BRPM | OXYGEN SATURATION: 99 % | SYSTOLIC BLOOD PRESSURE: 113 MMHG | DIASTOLIC BLOOD PRESSURE: 55 MMHG | WEIGHT: 213.85 LBS | HEART RATE: 71 BPM

## 2021-10-28 DIAGNOSIS — S21.009A UNSPECIFIED OPEN WOUND OF UNSPECIFIED BREAST, INITIAL ENCOUNTER: ICD-10-CM

## 2021-10-28 DIAGNOSIS — Z41.9 ENCOUNTER FOR PROCEDURE FOR PURPOSES OTHER THAN REMEDYING HEALTH STATE, UNSPECIFIED: Chronic | ICD-10-CM

## 2021-10-28 DIAGNOSIS — Z98.890 OTHER SPECIFIED POSTPROCEDURAL STATES: Chronic | ICD-10-CM

## 2021-10-28 DIAGNOSIS — Z85.3 PERSONAL HISTORY OF MALIGNANT NEOPLASM OF BREAST: ICD-10-CM

## 2021-10-28 DIAGNOSIS — C50.412 MALIGNANT NEOPLASM OF UPPER-OUTER QUADRANT OF LEFT FEMALE BREAST: ICD-10-CM

## 2021-10-28 PROCEDURE — 88305 TISSUE EXAM BY PATHOLOGIST: CPT | Mod: 26

## 2021-10-28 PROCEDURE — 88304 TISSUE EXAM BY PATHOLOGIST: CPT

## 2021-10-28 PROCEDURE — 88305 TISSUE EXAM BY PATHOLOGIST: CPT

## 2021-10-28 PROCEDURE — 88304 TISSUE EXAM BY PATHOLOGIST: CPT | Mod: 26

## 2021-10-28 PROCEDURE — 88307 TISSUE EXAM BY PATHOLOGIST: CPT

## 2021-10-28 PROCEDURE — 88307 TISSUE EXAM BY PATHOLOGIST: CPT | Mod: 26

## 2021-10-28 PROCEDURE — 19301 PARTIAL MASTECTOMY: CPT | Mod: LT

## 2021-10-28 RX ORDER — HYDROMORPHONE HYDROCHLORIDE 2 MG/ML
0.5 INJECTION INTRAMUSCULAR; INTRAVENOUS; SUBCUTANEOUS
Refills: 0 | Status: DISCONTINUED | OUTPATIENT
Start: 2021-10-28 | End: 2021-10-28

## 2021-10-28 RX ORDER — SODIUM CHLORIDE 9 MG/ML
1000 INJECTION, SOLUTION INTRAVENOUS
Refills: 0 | Status: DISCONTINUED | OUTPATIENT
Start: 2021-10-28 | End: 2021-10-28

## 2021-10-28 RX ORDER — ONDANSETRON 8 MG/1
4 TABLET, FILM COATED ORAL ONCE
Refills: 0 | Status: DISCONTINUED | OUTPATIENT
Start: 2021-10-28 | End: 2021-10-28

## 2021-10-28 RX ADMIN — SODIUM CHLORIDE 50 MILLILITER(S): 9 INJECTION, SOLUTION INTRAVENOUS at 09:41

## 2021-10-28 RX ADMIN — HYDROMORPHONE HYDROCHLORIDE 0.5 MILLIGRAM(S): 2 INJECTION INTRAMUSCULAR; INTRAVENOUS; SUBCUTANEOUS at 13:07

## 2021-10-28 RX ADMIN — HYDROMORPHONE HYDROCHLORIDE 0.5 MILLIGRAM(S): 2 INJECTION INTRAMUSCULAR; INTRAVENOUS; SUBCUTANEOUS at 12:32

## 2021-10-28 RX ADMIN — HYDROMORPHONE HYDROCHLORIDE 0.5 MILLIGRAM(S): 2 INJECTION INTRAMUSCULAR; INTRAVENOUS; SUBCUTANEOUS at 12:42

## 2021-10-28 NOTE — BRIEF OPERATIVE NOTE - NSICDXBRIEFPOSTOP_GEN_ALL_CORE_FT
POST-OP DIAGNOSIS:  History of breast cancer 28-Oct-2021 09:57:24  Joe Lee  
POST-OP DIAGNOSIS:  History of breast cancer 28-Oct-2021 09:57:24  Joe Lee  Carcinoma of breast upper outer quadrant, left 28-Oct-2021 11:40:43  Palleschi, Susan M

## 2021-10-28 NOTE — BRIEF OPERATIVE NOTE - NSICDXBRIEFPROCEDURE_GEN_ALL_CORE_FT
PROCEDURES:  Reconstruction, breast, using oncoplastic technique 28-Oct-2021 09:56:51  Joe Lee  
PROCEDURES:  Reconstruction, breast, using oncoplastic technique 28-Oct-2021 09:56:51  Joe eLe  Left breast lumpectomy 28-Oct-2021 11:40:08 re-excision superior margin Palleschi, Susan M

## 2021-10-28 NOTE — ASU DISCHARGE PLAN (ADULT/PEDIATRIC) - CARE PROVIDER_API CALL
Nikunj Watson)  Plastic Surgery  [address] [facility], [address]  [place] [address]  Phone: [phone]  Fax: [phone]  Follow Up Time: 1 week    Palleschi, Susan M (MD)  Surgery  [address] [facility]  [place] [address]  Phone: [phone]  Fax: [phone]  Follow Up Time: 2 weeks

## 2021-10-28 NOTE — ASU DISCHARGE PLAN (ADULT/PEDIATRIC) - CALL YOUR DOCTOR IF YOU HAVE ANY OF THE FOLLOWING:
Bleeding that does not stop/Swelling that gets worse/Wound/Surgical Site with redness, or foul smelling discharge or pus Bleeding that does not stop/Swelling that gets worse/Pain not relieved by Medications/Fever greater than (need to indicate Fahrenheit or Celsius)/Wound/Surgical Site with redness, or foul smelling discharge or pus/Nausea and vomiting that does not stop/Inability to tolerate liquids or foods

## 2021-10-28 NOTE — BRIEF OPERATIVE NOTE - NSICDXBRIEFPREOP_GEN_ALL_CORE_FT
PRE-OP DIAGNOSIS:  History of breast cancer 28-Oct-2021 09:57:01  Joe Lee  
PRE-OP DIAGNOSIS:  History of breast cancer 28-Oct-2021 09:57:01  Joe Lee  Carcinoma of breast upper outer quadrant, left 28-Oct-2021 11:40:33  Palleschi, Susan M

## 2021-10-28 NOTE — ASU PREOP CHECKLIST - SELECT TESTS ORDERED
12/28/21 urine pregnancy negative on admission/UCG 10/28/21 urine pregnancy negative on admission/UCG/COVID-19

## 2021-11-02 LAB — SURGICAL PATHOLOGY STUDY: SIGNIFICANT CHANGE UP

## 2022-06-07 NOTE — ASU PATIENT PROFILE, ADULT - NSALCOHOLTYPE_GEN__A_CORE_SD
wine Metronidazole Pregnancy And Lactation Text: This medication is Pregnancy Category B and considered safe during pregnancy.  It is also excreted in breast milk.

## 2022-09-16 NOTE — H&P PST ADULT - VENOUS THROMBOEMBOLISM
no Gabapentin Counseling: I discussed with the patient the risks of gabapentin including but not limited to dizziness, somnolence, fatigue and ataxia.

## 2022-12-21 ENCOUNTER — APPOINTMENT (OUTPATIENT)
Dept: PLASTIC SURGERY | Facility: CLINIC | Age: 52
End: 2022-12-21

## 2022-12-21 VITALS
HEART RATE: 66 BPM | OXYGEN SATURATION: 99 % | RESPIRATION RATE: 16 BRPM | BODY MASS INDEX: 34.15 KG/M2 | WEIGHT: 200 LBS | TEMPERATURE: 97.3 F | HEIGHT: 64 IN

## 2022-12-21 PROCEDURE — 99213 OFFICE O/P EST LOW 20 MIN: CPT

## 2022-12-21 NOTE — CONSULT LETTER
[Dear  ___] : Dear  [unfilled], [Courtesy Letter:] : I had the pleasure of seeing your patient, [unfilled], in my office today. [Please see my note below.] : Please see my note below. [Sincerely,] : Sincerely, [DrJostin  ___] : Dr. AVELAR [DrJostin ___] : Dr. AVELAR [FreeTextEntry3] : Tonya Mott, RPA-C\par Breast Surgery\par [address] [facility]\par [address]\[place] [address]\par (Phone) [phone]\par (Fax) [phone]

## 2022-12-21 NOTE — HISTORY OF PRESENT ILLNESS
[FreeTextEntry1] : She has a history of Stage IA left breast cancer\par Family history of breast cancer in her sister in her mid 30's and early 40's and a paternal aunt at age 45\par Genetic testing negative, (CHEK2 VUS)\par 10/4/2021 s/p left 1:00 Aysha  localization lumpectomy, left axillary sentinel lymph node biopsy and left 5:00 Aysha  localization breast biopsy with bilateral oncoplastic lift. 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 Pathology left 1:00 invasive lobular carcinoma, classical type, grade 2. The invasive tumor is present in 2 foci measuring 1.3 cm and 0.2 cm, no LVI, positive margins. 8 LNs negative. ER+/CO+/Her2-\par Plastics Dr. Watson\par 10/28/2021 s/p left re-excision lumpectomy. Pathology focal ALH, no residual carcinoma\par Oncotype Dx 17\par Rad ONC: Dr. Charles, completed XRT 1/8/2022\par Med ONC: Dr. Bui, started Tamoxifen 1/2022. saw him 9/2022\par 5/23/2022 Bilateral mammogram: right reduction scarring, left post-op scarring with skin thickening and increased trabeculation c/w post-treatment changes\par Bilateral ultrasound: Fat necrosis/oil cysts right 8:00 (N8cm) x 2, 9:00 (N2cm), 8:00 (N2cm), 8:00 (N6cm) and left 3:00 (N7cm), 3:00 (N1cm), 2:00 (N4cm). Left 4:00 scarring and right 8:00 scarring with associated 1.5 cm area of fat necrosis. BI-RADS 2\par 5/23/2022 Bilateral MRI: benign post-op/post-treatment changes. BI-RADS 2\par No breast concerns\par Up to date with MDs

## 2022-12-21 NOTE — PHYSICAL EXAM
[Normocephalic] : normocephalic [Atraumatic] : atraumatic [Supple] : supple [No Supraclavicular Adenopathy] : no supraclavicular adenopathy [Examined in the supine and seated position] : examined in the supine and seated position [No dominant masses] : no dominant masses in right breast  [No dominant masses] : no dominant masses left breast [No Nipple Retraction] : no left nipple retraction [No Nipple Discharge] : no left nipple discharge [No Axillary Lymphadenopathy] : no left axillary lymphadenopathy [No Edema] : no edema [No Rashes] : no rashes [No Ulceration] : no ulceration [EOMI] : extra ocular movement intact [Sclera nonicteric] : sclera nonicteric [de-identified] : S/P left lumpectomy with bilateral oncoplastic lift. Incisions well-healed bilaterally. [de-identified] : Post-XRT hyperpigmentation.

## 2022-12-21 NOTE — ASSESSMENT
[FreeTextEntry1] : H/O left breast cancer (invasive lobular, Stage IA, 10/2021)\par No evidence of disease recurrence on CBE \par \par 1. Annual bilateral mammogram and breast ultrasound due 5/2023\par 2. Follow up office visit due 4/2023\par 3. Advised monthly self breast examinations and advised her to contact me if she has any concerns.

## 2023-04-21 ENCOUNTER — APPOINTMENT (OUTPATIENT)
Dept: PLASTIC SURGERY | Facility: CLINIC | Age: 53
End: 2023-04-21

## 2023-04-25 ENCOUNTER — APPOINTMENT (OUTPATIENT)
Dept: PLASTIC SURGERY | Facility: CLINIC | Age: 53
End: 2023-04-25
Payer: COMMERCIAL

## 2023-06-07 NOTE — H&P PST ADULT - FALL HARM RISK CONCLUSION
Universal Safety Interventions Dupixent Counseling: I discussed with the patient the risks of dupilumab including but not limited to eye infection and irritation, cold sores, injection site reactions, worsening of asthma, allergic reactions and increased risk of parasitic infection.  Live vaccines should be avoided while taking dupilumab. Dupilumab will also interact with certain medications such as warfarin and cyclosporine. The patient understands that monitoring is required and they must alert us or the primary physician if symptoms of infection or other concerning signs are noted.

## 2024-03-05 ENCOUNTER — NON-APPOINTMENT (OUTPATIENT)
Age: 54
End: 2024-03-05

## 2024-03-06 ENCOUNTER — APPOINTMENT (OUTPATIENT)
Dept: PLASTIC SURGERY | Facility: CLINIC | Age: 54
End: 2024-03-06
Payer: COMMERCIAL

## 2024-03-06 VITALS
TEMPERATURE: 97.3 F | WEIGHT: 200 LBS | BODY MASS INDEX: 34.15 KG/M2 | HEART RATE: 71 BPM | DIASTOLIC BLOOD PRESSURE: 74 MMHG | OXYGEN SATURATION: 95 % | SYSTOLIC BLOOD PRESSURE: 114 MMHG | HEIGHT: 64 IN

## 2024-03-06 DIAGNOSIS — Z17.0 MALIGNANT NEOPLASM OF UPPER-OUTER QUADRANT OF LEFT FEMALE BREAST: ICD-10-CM

## 2024-03-06 DIAGNOSIS — C50.412 MALIGNANT NEOPLASM OF UPPER-OUTER QUADRANT OF LEFT FEMALE BREAST: ICD-10-CM

## 2024-03-06 DIAGNOSIS — N64.1 FAT NECROSIS OF BREAST: ICD-10-CM

## 2024-03-06 PROCEDURE — 99213 OFFICE O/P EST LOW 20 MIN: CPT

## 2024-03-06 NOTE — PHYSICAL EXAM
[Atraumatic] : atraumatic [Normocephalic] : normocephalic [EOMI] : extra ocular movement intact [Supple] : supple [Sclera nonicteric] : sclera nonicteric [No Supraclavicular Adenopathy] : no supraclavicular adenopathy [Examined in the supine and seated position] : examined in the supine and seated position [No dominant masses] : no dominant masses in right breast  [No dominant masses] : no dominant masses left breast [No Nipple Retraction] : no left nipple retraction [No Nipple Discharge] : no left nipple discharge [No Axillary Lymphadenopathy] : no left axillary lymphadenopathy [No Edema] : no edema [No Ulceration] : no ulceration [No Rashes] : no rashes [de-identified] : S/P left lumpectomy with bilateral oncoplastic lift. Incisions well-healed bilaterally. [de-identified] : Post-XRT hyperpigmentation.

## 2024-03-06 NOTE — ASSESSMENT
[FreeTextEntry1] : H/O left breast cancer (invasive lobular, Stage IA, 10/2021)\par No evidence of disease recurrence on CBE \par \par 1. Annual bilateral mammogram and breast ultrasound due 5/2023\par 2. Follow up office visit due 8/2023\par 3. Advised monthly self breast examinations and advised her to contact me if she has any concerns.

## 2024-03-06 NOTE — PHYSICAL EXAM
[de-identified] : S/P left lumpectomy with bilateral oncoplastic lift. Incisions well-healed bilaterally. [de-identified] : Post-XRT hyperpigmentation.

## 2024-03-06 NOTE — HISTORY OF PRESENT ILLNESS
[FreeTextEntry1] : She has a history of Stage IA left breast cancer Family history of breast cancer in her sister in her mid 30's and early 40's and a paternal aunt at age 45 Genetic testing negative, (CHEK2 VUS) 10/4/2021 s/p left 1:00 Aysha  localization lumpectomy, left axillary sentinel lymph node biopsy and left 5:00 Aysha  localization breast biopsy with bilateral oncoplastic lift. 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 Pathology left 1:00 invasive lobular carcinoma, classical type, grade 2. The invasive tumor is present in 2 foci measuring 1.3 cm and 0.2 cm, no LVI, positive margins. 8 LNs negative. ER+/CO+/Her2- Plastics Dr. Watson 10/28/2021 s/p left re-excision lumpectomy. Pathology focal ALH, no residual carcinoma Oncotype Dx 17 Rad ONC: Dr. Charles, completed XRT 1/8/2022 Med ONC: Dr. Bui, started Tamoxifen 1/2022. saw him 8/2023. 5/23/2022 Bilateral MRI: benign post-op/post-treatment changes. BI-RADS 2 5/25/2023 Bilateral mammogram: Scattered areas of fibroglandular density.  No significant masses, calcifications, or other abnormal findings. There is newly noted scarring with fat necrosis in the left lateral breast at 2:00/3:00 with an approximately 6 mm ringed lucency with surrou ding curvilinear calcifications. Developing 1.0 cm ring lucency is noted in the left upper outer breast posterior 3rd consistent with fat necrosis. There is a 7 mm round soft tissue density with associated coarse dystrophic calcifications in the right upper outer breast middle depth at 10:00. There is stable 1.0 cm asymmetry with newly noted associated ringed coarse calcifications aroudn central lucency consistent with fat necrosis in the right upper outer breast anterior 3rd. Bilateral ultrasound: No suspicious abrnomalities seen. There are several areas of stable fat necrosis as follows: Right 8:00 8 cm from the nipple measuring 9 x 7 x 10 mm Right 9:00 2 cm from the nipple measuring 6 x 5 x 6 mm Left 3:00 1 cm from the nipple measuring 3 x 1 x 3 mm Left 3:00 7 cm from the nipple measuring 6 x 4 x 6 mm Left 12:00 6 cm from the nipple measuring 12 x 9 x 12 mm - newly noted but classic in appearance with anechoic fluid and coarsely calcified circumferential rim calcification. Scarring is noted in the right breast at 8:00, 6 cm from the nipple and in the left breast at 2:00, 4 cm from the nipple there is a sub centimeters seroma in the scar in the left breast at 2:00. BI-RADS 2 She notes left intermittent discomfort and "bumpy" scar tissue.

## 2024-03-06 NOTE — CONSULT LETTER
[Dear  ___] : Dear  [unfilled], [Please see my note below.] : Please see my note below. [Courtesy Letter:] : I had the pleasure of seeing your patient, [unfilled], in my office today. [Sincerely,] : Sincerely, [DrJostin  ___] : Dr. AVELAR [DrJostin ___] : Dr. AVELAR [FreeTextEntry3] : Tonya Mott, RPA-C Breast Surgery [address] [place] [address], [place] [address] (Phone) [phone] (Fax) [phone]

## 2024-03-06 NOTE — ASSESSMENT
[FreeTextEntry1] : H/O left breast cancer (invasive lobular, Stage IA, 10/2021) No evidence of disease recurrence on CBE   1. Annual bilateral mammogram and breast ultrasound due 5/2024 2. Follow up office visit due 9/2024 3. Advised monthly self breast examinations and advised her to contact me if she has any concerns.

## 2024-09-27 ENCOUNTER — APPOINTMENT (OUTPATIENT)
Dept: PLASTIC SURGERY | Facility: CLINIC | Age: 54
End: 2024-09-27
Payer: COMMERCIAL

## 2024-09-27 VITALS
BODY MASS INDEX: 34.15 KG/M2 | OXYGEN SATURATION: 97 % | SYSTOLIC BLOOD PRESSURE: 94 MMHG | TEMPERATURE: 97.2 F | HEART RATE: 81 BPM | DIASTOLIC BLOOD PRESSURE: 66 MMHG | HEIGHT: 64 IN | WEIGHT: 200 LBS

## 2024-09-27 DIAGNOSIS — C50.412 MALIGNANT NEOPLASM OF UPPER-OUTER QUADRANT OF LEFT FEMALE BREAST: ICD-10-CM

## 2024-09-27 DIAGNOSIS — Z17.0 MALIGNANT NEOPLASM OF UPPER-OUTER QUADRANT OF LEFT FEMALE BREAST: ICD-10-CM

## 2024-09-27 PROCEDURE — 99213 OFFICE O/P EST LOW 20 MIN: CPT

## 2024-09-27 NOTE — REVIEW OF SYSTEMS
Called pt to scheduled thoracic surgery consult, pt is scheduled 3/7/2024 [facility]. Confirmed date, time and location,. Pt understood, and was thankful for the assistance    [Negative] : Heme/Lymph

## 2024-09-27 NOTE — CONSULT LETTER
[Dear  ___] : Dear  [unfilled], [Courtesy Letter:] : I had the pleasure of seeing your patient, [unfilled], in my office today. [Please see my note below.] : Please see my note below. [Sincerely,] : Sincerely, [DrJostin  ___] : Dr. AVELAR [DrJostin ___] : Dr. AVELAR [FreeTextEntry3] : Tonya Mott, RPA-C Breast Surgery [address] [place] [address], [place] [address] (Phone) [phone] (Fax) [phone]

## 2024-09-27 NOTE — HISTORY OF PRESENT ILLNESS
[FreeTextEntry1] : She has a history of Stage IA left breast cancer Family history of breast cancer in her sister in her mid 30's and early 40's and a paternal aunt at age 45 Genetic testing negative, (CHEK2 VUS) 10/4/2021 s/p left 1:00 Aysha  localization lumpectomy, left axillary sentinel lymph node biopsy and left 5:00 Aysha  localization breast biopsy with bilateral oncoplastic lift. 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 Pathology left 1:00 invasive lobular carcinoma, classical type, grade 2. The invasive tumor is present in 2 foci measuring 1.3 cm and 0.2 cm, no LVI, positive margins. 8 LNs negative. ER+/AZ+/Her2- Plastics Dr. Watson 10/28/2021 s/p left re-excision lumpectomy. Pathology focal ALH, no residual carcinoma Oncotype Dx 17 Rad ONC: Dr. Charles, completed XRT 1/8/2022 5/23/2022 Bilateral MRI: benign post-op/post-treatment changes. BI-RADS 2 5/31/2024 Bilateral mammogram: Scattered areas of fibroglandular density.  No significant masses, calcifications, or other abnormal findings. Stable postlumpectomy changes in the left breast and stable post reduction changes in the right breast. Partially calcified and non calcified areas of fat necrosis are stable. Bilateral ultrasound: No suspicious abrnomalities seen.  Right 8:00, 8 cm from the nipple, stable partially calcified area of fat necrosis measuring 7 x 4 x 6 mm. Right 9:00, 2 cm from the nipple, stable calcified fat necrosis measuring 5 x 4 x 4 mm Left 3:00,1 cm from the nipple, 4 x 2 x 5 mm sebaceous cyst vs oil cyst.  Left 3:00, 7 cm from the nipple, stable 7 x 5 x 6 mm area of fat necrosis.  4:00, 8 cm from the nipple, scar site from prior lumpectomy. Left 12:00, 6 cm from the nipple, stable 10 x 7 x 1 mm partially calcified fat necrosis.  BI-RADS 2 She denies any current breast concerns. Up to date with MDs

## 2024-09-27 NOTE — PHYSICAL EXAM
[Normocephalic] : normocephalic [Atraumatic] : atraumatic [EOMI] : extra ocular movement intact [Sclera nonicteric] : sclera nonicteric [Supple] : supple [No Supraclavicular Adenopathy] : no supraclavicular adenopathy [Examined in the supine and seated position] : examined in the supine and seated position [No dominant masses] : no dominant masses in right breast  [No dominant masses] : no dominant masses left breast [No Nipple Retraction] : no left nipple retraction [No Nipple Discharge] : no left nipple discharge [No Axillary Lymphadenopathy] : no left axillary lymphadenopathy [No Edema] : no edema [No Rashes] : no rashes [No Ulceration] : no ulceration [de-identified] : S/P left lumpectomy with bilateral oncoplastic lift. Incisions well-healed bilaterally. [de-identified] : Post-XRT hyperpigmentation.

## 2024-09-27 NOTE — CONSULT LETTER
[Dear  ___] : Dear  [unfilled], [Courtesy Letter:] : I had the pleasure of seeing your patient, [unfilled], in my office today. [Please see my note below.] : Please see my note below. [Sincerely,] : Sincerely, [DrJostin  ___] : Dr. AVELAR [DrJostin ___] : Dr. AVELAR [FreeTextEntry3] : Tonya Mott, RPA-C Breast Surgery [address] [place] [address], [place] [address] (Phone) [phone] (Fax) [phone]

## 2024-09-27 NOTE — PHYSICAL EXAM
[Normocephalic] : normocephalic [Atraumatic] : atraumatic [EOMI] : extra ocular movement intact [Sclera nonicteric] : sclera nonicteric [Supple] : supple [No Supraclavicular Adenopathy] : no supraclavicular adenopathy [Examined in the supine and seated position] : examined in the supine and seated position [No dominant masses] : no dominant masses in right breast  [No dominant masses] : no dominant masses left breast [No Nipple Retraction] : no left nipple retraction [No Nipple Discharge] : no left nipple discharge [No Axillary Lymphadenopathy] : no left axillary lymphadenopathy [No Edema] : no edema [No Rashes] : no rashes [No Ulceration] : no ulceration [de-identified] : S/P left lumpectomy with bilateral oncoplastic lift. Incisions well-healed bilaterally. [de-identified] : Post-XRT hyperpigmentation.

## 2024-09-27 NOTE — ASSESSMENT
[FreeTextEntry1] : H/O left breast cancer (invasive lobular, Stage IA, 10/2021) No evidence of disease recurrence on CBE   1. Annual bilateral mammogram and breast ultrasound due 5/2025 2. Follow up office visit due 3/2025 3. Advised monthly self breast examinations and advised her to contact me if she has any concerns.

## 2024-09-27 NOTE — HISTORY OF PRESENT ILLNESS
[FreeTextEntry1] : She has a history of Stage IA left breast cancer Family history of breast cancer in her sister in her mid 30's and early 40's and a paternal aunt at age 45 Genetic testing negative, (CHEK2 VUS) 10/4/2021 s/p left 1:00 Aysha  localization lumpectomy, left axillary sentinel lymph node biopsy and left 5:00 Aysha  localization breast biopsy with bilateral oncoplastic lift. 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 Pathology left 1:00 invasive lobular carcinoma, classical type, grade 2. The invasive tumor is present in 2 foci measuring 1.3 cm and 0.2 cm, no LVI, positive margins. 8 LNs negative. ER+/UT+/Her2- Plastics Dr. Watson 10/28/2021 s/p left re-excision lumpectomy. Pathology focal ALH, no residual carcinoma Oncotype Dx 17 Rad ONC: Dr. Charles, completed XRT 1/8/2022 5/23/2022 Bilateral MRI: benign post-op/post-treatment changes. BI-RADS 2 5/31/2024 Bilateral mammogram: Scattered areas of fibroglandular density.  No significant masses, calcifications, or other abnormal findings. Stable postlumpectomy changes in the left breast and stable post reduction changes in the right breast. Partially calcified and non calcified areas of fat necrosis are stable. Bilateral ultrasound: No suspicious abrnomalities seen.  Right 8:00, 8 cm from the nipple, stable partially calcified area of fat necrosis measuring 7 x 4 x 6 mm. Right 9:00, 2 cm from the nipple, stable calcified fat necrosis measuring 5 x 4 x 4 mm Left 3:00,1 cm from the nipple, 4 x 2 x 5 mm sebaceous cyst vs oil cyst.  Left 3:00, 7 cm from the nipple, stable 7 x 5 x 6 mm area of fat necrosis.  4:00, 8 cm from the nipple, scar site from prior lumpectomy. Left 12:00, 6 cm from the nipple, stable 10 x 7 x 1 mm partially calcified fat necrosis.  BI-RADS 2 She denies any current breast concerns. Up to date with MDs

## 2025-03-31 ENCOUNTER — APPOINTMENT (OUTPATIENT)
Dept: PLASTIC SURGERY | Facility: CLINIC | Age: 55
End: 2025-03-31
Payer: COMMERCIAL

## 2025-03-31 VITALS
OXYGEN SATURATION: 99 % | WEIGHT: 191 LBS | TEMPERATURE: 97.2 F | BODY MASS INDEX: 32.61 KG/M2 | HEIGHT: 64 IN | HEART RATE: 77 BPM

## 2025-03-31 DIAGNOSIS — Z17.0 MALIGNANT NEOPLASM OF UPPER-OUTER QUADRANT OF LEFT FEMALE BREAST: ICD-10-CM

## 2025-03-31 DIAGNOSIS — C50.412 MALIGNANT NEOPLASM OF UPPER-OUTER QUADRANT OF LEFT FEMALE BREAST: ICD-10-CM

## 2025-03-31 PROCEDURE — 99213 OFFICE O/P EST LOW 20 MIN: CPT

## 2025-06-10 ENCOUNTER — APPOINTMENT (OUTPATIENT)
Dept: ULTRASOUND IMAGING | Facility: IMAGING CENTER | Age: 55
End: 2025-06-10
Payer: COMMERCIAL

## 2025-06-10 ENCOUNTER — APPOINTMENT (OUTPATIENT)
Dept: MAMMOGRAPHY | Facility: IMAGING CENTER | Age: 55
End: 2025-06-10
Payer: COMMERCIAL

## 2025-06-10 ENCOUNTER — RESULT REVIEW (OUTPATIENT)
Age: 55
End: 2025-06-10

## 2025-06-10 ENCOUNTER — OUTPATIENT (OUTPATIENT)
Dept: OUTPATIENT SERVICES | Facility: HOSPITAL | Age: 55
LOS: 1 days | End: 2025-06-10
Payer: COMMERCIAL

## 2025-06-10 DIAGNOSIS — Z00.8 ENCOUNTER FOR OTHER GENERAL EXAMINATION: ICD-10-CM

## 2025-06-10 DIAGNOSIS — N64.1 FAT NECROSIS OF BREAST: ICD-10-CM

## 2025-06-10 DIAGNOSIS — Z41.9 ENCOUNTER FOR PROCEDURE FOR PURPOSES OTHER THAN REMEDYING HEALTH STATE, UNSPECIFIED: Chronic | ICD-10-CM

## 2025-06-10 DIAGNOSIS — Z98.890 OTHER SPECIFIED POSTPROCEDURAL STATES: Chronic | ICD-10-CM

## 2025-06-10 DIAGNOSIS — C50.412 MALIGNANT NEOPLASM OF UPPER-OUTER QUADRANT OF LEFT FEMALE BREAST: ICD-10-CM

## 2025-06-10 PROCEDURE — 76641 ULTRASOUND BREAST COMPLETE: CPT | Mod: 26,50

## 2025-06-10 PROCEDURE — 77066 DX MAMMO INCL CAD BI: CPT

## 2025-06-10 PROCEDURE — 77066 DX MAMMO INCL CAD BI: CPT | Mod: 26

## 2025-06-10 PROCEDURE — 76641 ULTRASOUND BREAST COMPLETE: CPT

## 2025-06-10 PROCEDURE — G0279: CPT | Mod: 26

## 2025-06-10 PROCEDURE — G0279: CPT
